# Patient Record
Sex: MALE | Race: WHITE | NOT HISPANIC OR LATINO | Employment: FULL TIME | URBAN - NONMETROPOLITAN AREA
[De-identification: names, ages, dates, MRNs, and addresses within clinical notes are randomized per-mention and may not be internally consistent; named-entity substitution may affect disease eponyms.]

---

## 2024-01-31 ENCOUNTER — HOSPITAL ENCOUNTER (OUTPATIENT)
Dept: RADIOLOGY | Facility: HOSPITAL | Age: 69
Discharge: HOME | End: 2024-01-31
Payer: COMMERCIAL

## 2024-01-31 ENCOUNTER — OFFICE VISIT (OUTPATIENT)
Dept: URGENT CARE | Facility: CLINIC | Age: 69
End: 2024-01-31
Payer: COMMERCIAL

## 2024-01-31 VITALS
BODY MASS INDEX: 37.3 KG/M2 | WEIGHT: 190 LBS | OXYGEN SATURATION: 98 % | RESPIRATION RATE: 14 BRPM | TEMPERATURE: 97.7 F | HEART RATE: 58 BPM | DIASTOLIC BLOOD PRESSURE: 95 MMHG | HEIGHT: 60 IN | SYSTOLIC BLOOD PRESSURE: 156 MMHG

## 2024-01-31 DIAGNOSIS — M77.50 TENDONITIS OF FOOT: Primary | ICD-10-CM

## 2024-01-31 DIAGNOSIS — M79.672 FOOT PAIN, LEFT: ICD-10-CM

## 2024-01-31 PROCEDURE — 73630 X-RAY EXAM OF FOOT: CPT | Mod: LT

## 2024-01-31 PROCEDURE — 73630 X-RAY EXAM OF FOOT: CPT | Mod: LEFT SIDE | Performed by: RADIOLOGY

## 2024-01-31 PROCEDURE — 99213 OFFICE O/P EST LOW 20 MIN: CPT | Mod: 25,27 | Performed by: NURSE PRACTITIONER

## 2024-01-31 RX ORDER — PREDNISONE 20 MG/1
20 TABLET ORAL DAILY
Qty: 5 TABLET | Refills: 0 | Status: SHIPPED | OUTPATIENT
Start: 2024-01-31 | End: 2024-02-05

## 2024-01-31 NOTE — PROGRESS NOTES
[facility] URGENT CARE GERARDO NOTE:      Name: Demarco Daugherty, 68 y.o.    CSN:[id number]   MRN:[id number]    PCP: No Assigned PCP Generic Provider, MD    ALL:  No Known Allergies    History:    Chief Complaint: Foot Pain (LEFT FOOT PAIN X 6-8 WEEKS, 3 DAYS BEEN CONSTANT)    Encounter Date: 1/31/2024  10:55     HPI: The history was obtained from the patient. Demarco is a 68 y.o. male, who presents with a chief complaint of Foot Pain (LEFT FOOT PAIN X 6-8 WEEKS, 3 DAYS BEEN CONSTANT) Patient states location is on LT distal metatarsals 2-4. States that pain is on plantar side as well. Currently rates pain a 7/10, swelling, does not radiate, ibuprofen did not really help for pain, walking originally made it better but now pain is constant. Has a Hx of gout, no Hx of injuries or surgeries on area. Denies any trauma, numbness/tingling, weakness. Patient states that he climbs ladders a lot for work.     PMHx:    History reviewed. No pertinent past medical history.           No current outpatient medications on file.     No current facility-administered medications for this visit.         PMSx:    Past Surgical History:   Procedure Laterality Date    APPENDECTOMY         Fam Hx: No family history on file.    SOC. Hx:     Social History     Socioeconomic History    Marital status:      Spouse name: Not on file    Number of children: Not on file    Years of education: Not on file    Highest education level: Not on file   Occupational History    Not on file   Tobacco Use    Smoking status: Never    Smokeless tobacco: Never   Substance and Sexual Activity    Alcohol use: Yes    Drug use: Never    Sexual activity: Not on file   Other Topics Concern    Not on file   Social History Narrative    Not on file     Social Determinants of Health     Financial Resource Strain: Not on file   Food Insecurity: Not on file   Transportation Needs: Not on file   Physical Activity: Not on file   Stress: Not on file   Social  Connections: Not on file   Intimate Partner Violence: Not on file   Housing Stability: Not on file         Vitals:    01/31/24 1051   BP: (!) 156/95   Pulse: 58   Resp: 14   Temp: 36.5 °C (97.7 °F)   SpO2: 98%     86.2 kg (190 lb)          Physical Exam  Constitutional:       General: He is not in acute distress.     Appearance: Normal appearance.   Eyes:      Extraocular Movements: Extraocular movements intact.   Pulmonary:      Effort: Pulmonary effort is normal.   Musculoskeletal:      Cervical back: Normal range of motion.      Left foot: Normal range of motion. No deformity or prominent metatarsal heads.   Feet:      Left foot:      Skin integrity: Skin integrity normal. No ulcer, blister, erythema, warmth or callus.      Comments: Normal ROM, no weakness. Pain when palpating the dorsal and ventral metatarsophalangeal joints.   Skin:     General: Skin is warm.      Capillary Refill: Capillary refill takes less than 2 seconds.   Neurological:      General: No focal deficit present.      Mental Status: He is alert and oriented to person, place, and time.   Psychiatric:         Mood and Affect: Mood normal.         Behavior: Behavior normal.           LABORATORY @ RADIOLOGICAL IMAGING (if done):     L Foot xray pending     COURSE/MEDICAL DECISION MAKING:    Demarco was seen today for foot pain.  Diagnoses and all orders for this visit:  Tendonitis of foot (Primary)  -     predniSONE (Deltasone) 20 mg tablet; Take 1 tablet (20 mg) by mouth once daily for 5 days.  Foot pain, left  -     XR foot left 3+ views; Future     with a differential to include: Tendonitis, Daniel's neuroma, Fracture, Arthritis     Most likely Ddx is tendonitis as patient is constantly climbing ladders at work. Furthermore, there is pain with pressure on metatarsophalangeal joints. Other Dx lower on Ddx due to no Hx of trauma/arthritis, numbness/tingling, radiation of pain.     Patient is to obtain an X-ray of the foot and start on prednisone  medication. Educated patient that we will call him with the results of the X-ray. Patient to return if worsening symptoms. Patient agreed to treatment plan and all concerns were addressed.     James ARORA  [facility]      Supervised by:   BRITNI Mahmood    Advanced Practice Provider  [facility] URGENT CARE

## 2024-12-24 ENCOUNTER — OFFICE VISIT (OUTPATIENT)
Dept: URGENT CARE | Facility: CLINIC | Age: 69
End: 2024-12-24
Payer: COMMERCIAL

## 2024-12-24 VITALS
RESPIRATION RATE: 14 BRPM | DIASTOLIC BLOOD PRESSURE: 91 MMHG | WEIGHT: 200 LBS | TEMPERATURE: 96.6 F | BODY MASS INDEX: 27.09 KG/M2 | HEART RATE: 57 BPM | OXYGEN SATURATION: 97 % | SYSTOLIC BLOOD PRESSURE: 156 MMHG | HEIGHT: 72 IN

## 2024-12-24 DIAGNOSIS — M77.50 TENDINITIS OF ANKLE OR FOOT: Primary | ICD-10-CM

## 2024-12-24 PROCEDURE — 99213 OFFICE O/P EST LOW 20 MIN: CPT | Performed by: NURSE PRACTITIONER

## 2024-12-24 RX ORDER — PREDNISONE 10 MG/1
30 TABLET ORAL DAILY
Qty: 21 TABLET | Refills: 0 | Status: SHIPPED | OUTPATIENT
Start: 2024-12-24 | End: 2024-12-31

## 2024-12-24 NOTE — PROGRESS NOTES
[facility] URGENT CARE   GERARDO NOTE:      Name: Demarco Daugherty, 69 y.o.    CSN:[id number]   MRN:[id number]    PCP: No Assigned PCP Generic Provider, MD    ALL:  No Known Allergies    History:    Chief Complaint: Foot Pain (Right foot pain x 1 day)    Encounter Date: 12/24/2024      HPI: The history was obtained from the patient. Demarco is a 69 y.o. male, who presents with a chief complaint of Foot Pain (Right foot pain x 1 day)     Patient presents with right foot pain that began approximately 1 day ago.  States it feels like a gout flare he is experienced to the left great toe.  Pain is located to the plantar portion of the foot that worsens with walking and pressure.  Denies any numbness, tingling, warmth, no known injury.     PMHx:    History reviewed. No pertinent past medical history.           Current Outpatient Medications   Medication Sig Dispense Refill    predniSONE (Deltasone) 10 mg tablet Take 3 tablets (30 mg) by mouth once daily for 7 days. 21 tablet 0     No current facility-administered medications for this visit.         PMSx:    Past Surgical History:   Procedure Laterality Date    APPENDECTOMY         Fam Hx: No family history on file.    SOC. Hx:     Social History     Socioeconomic History    Marital status:      Spouse name: Not on file    Number of children: Not on file    Years of education: Not on file    Highest education level: Not on file   Occupational History    Not on file   Tobacco Use    Smoking status: Never    Smokeless tobacco: Never   Substance and Sexual Activity    Alcohol use: Yes    Drug use: Never    Sexual activity: Not on file   Other Topics Concern    Not on file   Social History Narrative    Not on file     Social Drivers of Health     Financial Resource Strain: Not on file   Food Insecurity: Not on file   Transportation Needs: Not on file   Physical Activity: Not on file   Stress: Not on file   Social Connections: Not on file   Intimate Partner Violence: Not  on file   Housing Stability: Not on file         Vitals:    12/24/24 1248   BP: (!) 156/91   Pulse: 57   Resp: 14   Temp: 35.9 °C (96.6 °F)   SpO2: 97%     90.7 kg (200 lb)          Physical Exam  Vitals and nursing note reviewed.   Constitutional:       General: He is not in acute distress.     Appearance: Normal appearance. He is not ill-appearing.   HENT:      Head: Normocephalic and atraumatic.      Mouth/Throat:      Mouth: Mucous membranes are moist.   Cardiovascular:      Rate and Rhythm: Normal rate and regular rhythm.      Heart sounds: Normal heart sounds.   Pulmonary:      Effort: Pulmonary effort is normal.      Breath sounds: Normal breath sounds.   Abdominal:      General: Bowel sounds are normal.   Musculoskeletal:        Feet:    Skin:     General: Skin is warm and dry.      Capillary Refill: Capillary refill takes less than 2 seconds.   Neurological:      Mental Status: He is alert and oriented to person, place, and time.           LABORATORY @ RADIOLOGICAL IMAGING (if done):     No results found for this or any previous visit (from the past 24 hours).    ____________________________________________________________________    I did personally review Demarco's past medical history, surgical history, social history, as well as family history (when relevant).  In this case, I also oversaw the his drug management by reviewing his medication list, allergy list, as well as the medications that I prescribed during the UC course and/or recommended as an out-patient (including possible OTC medications such as acetaminophen, NSAIDs , etc).    After reviewing the items above, I did look at previous medical documentation, such as recent hospitalizations, office visits, and/or recent consultations with PCP/specialist.                          SDOH:   Another factor that I considered in Demarco's care was his Social Determinants of Health (SDOH). During this UC encounter, he did not have social determinants of  health. Those SDOH influencing Demarco's care are: none      _____________________________________________________________________       COURSE/MEDICAL DECISION MAKING:    Demarco is a 69 y.o., who presents with a working diagnosis of   1. Tendinitis of ankle or foot        Demarco was seen today for foot pain.  Diagnoses and all orders for this visit:  Tendinitis of ankle or foot (Primary)  -     XR foot right 3+ views; Future  -     predniSONE (Deltasone) 10 mg tablet; Take 3 tablets (30 mg) by mouth once daily for 7 days.     If symptoms do not improve in the next 24 to 48 hours patient to have x-ray completed of his right foot.  If symptoms improve with prednisone will follow-up with primary care provider for further evaluation    Plan of care reviewed with patient.  If symptoms change and/or worsen will follow-up with primary care provider, return to urgent care, or go to the nearest emergency department for further evaluation.  Patient states understanding and is agreeable with plan of care.      JAS Moore, DNP   Advanced Practice Provider  [facility] URGENT CARE    Please note: While the patient may or may not have received printed discharge paperwork, all relevant medical findings, test results, and treatment details are accessible through the electronic medical record system. The patient is encouraged to review their chart via the patient portal for comprehensive information and follow-up instructions.